# Patient Record
Sex: FEMALE | Race: WHITE | NOT HISPANIC OR LATINO | Employment: OTHER | URBAN - METROPOLITAN AREA
[De-identification: names, ages, dates, MRNs, and addresses within clinical notes are randomized per-mention and may not be internally consistent; named-entity substitution may affect disease eponyms.]

---

## 2019-07-16 PROBLEM — Z11.3 SCREENING EXAMINATION FOR STD (SEXUALLY TRANSMITTED DISEASE): Status: ACTIVE | Noted: 2019-07-16

## 2023-08-25 ENCOUNTER — OFFICE VISIT (OUTPATIENT)
Dept: OBSTETRICS AND GYNECOLOGY | Facility: CLINIC | Age: 43
End: 2023-08-25
Payer: COMMERCIAL

## 2023-08-25 VITALS
HEIGHT: 64 IN | WEIGHT: 159 LBS | BODY MASS INDEX: 27.14 KG/M2 | SYSTOLIC BLOOD PRESSURE: 118 MMHG | DIASTOLIC BLOOD PRESSURE: 76 MMHG

## 2023-08-25 DIAGNOSIS — Z11.3 SCREENING EXAMINATION FOR STD (SEXUALLY TRANSMITTED DISEASE): ICD-10-CM

## 2023-08-25 DIAGNOSIS — Z01.419 ROUTINE GYNECOLOGICAL EXAMINATION: Primary | ICD-10-CM

## 2023-08-25 DIAGNOSIS — N89.8 VAGINAL DISCHARGE: ICD-10-CM

## 2023-08-25 DIAGNOSIS — Z01.419 PAP SMEAR, LOW-RISK: ICD-10-CM

## 2023-08-25 DIAGNOSIS — Z11.51 SPECIAL SCREENING EXAMINATION FOR HUMAN PAPILLOMAVIRUS (HPV): ICD-10-CM

## 2023-08-25 LAB
BILIRUB BLD-MCNC: NEGATIVE MG/DL
CLARITY, POC: CLEAR
COLOR UR: YELLOW
GLUCOSE UR STRIP-MCNC: NEGATIVE MG/DL
KETONES UR QL: NEGATIVE
LEUKOCYTE EST, POC: NEGATIVE
NITRITE UR-MCNC: NEGATIVE MG/ML
PH UR: 5 [PH] (ref 5–8)
PROT UR STRIP-MCNC: NEGATIVE MG/DL
RBC # UR STRIP: NEGATIVE /UL
SP GR UR: 1.03 (ref 1–1.03)
UROBILINOGEN UR QL: NORMAL

## 2023-08-25 RX ORDER — VENLAFAXINE HYDROCHLORIDE 75 MG/1
75 CAPSULE, EXTENDED RELEASE ORAL DAILY
COMMUNITY
Start: 2023-06-23

## 2023-08-25 RX ORDER — VENLAFAXINE HYDROCHLORIDE 37.5 MG/1
1 CAPSULE, EXTENDED RELEASE ORAL DAILY
COMMUNITY
Start: 2023-06-06

## 2023-08-25 NOTE — PROGRESS NOTES
GYN Annual Exam     Chief Complaint   Patient presents with    Gynecologic Exam       Lizeth Salomon is a 43 y.o. female who presents for annual well woman exam. She is an established patient. She is sexually active.  She is S/P partial hysterectomy d/t AUB.  Denies VB.     She is asking for a full STD screen today. She is worried about him being unfaithful.     HPI    OB History    No obstetric history on file.         Last Pap : uncertain   MM/23   History of abnormal Pap smear: no  Family history of uterine, colon or ovarian cancer: no  Family history of breast cancer: no  History of abnormal mammogram: yes -   Gardasil Vaccine: did not get     Past Medical History:   Diagnosis Date    Abnormal Pap smear of vagina and viginal HPV     Anemia     Asthma     no breathing complications for 13+ years     Bacterial vaginosis     Blood clotting disorder     Breast pain     Bronchitis     Cholelithiasis     Chronic cholecystitis     Contact dermatitis     Cough     Hypothyroidism     Sinusitis, acute     Sore throat     Vagina, candidiasis     Vaginal discharge     Varicella        Past Surgical History:   Procedure Laterality Date    HYSTERECTOMY      LAPAROSCOPIC CHOLECYSTECTOMY      TUBAL ABDOMINAL LIGATION           Current Outpatient Medications:     levothyroxine (SYNTHROID, LEVOTHROID) 75 MCG tablet, Take 1 tablet by mouth Daily., Disp: , Rfl:     pantoprazole (PROTONIX) 40 MG EC tablet, Take 1 tablet by mouth Daily., Disp: , Rfl:     Probiotic Product (PRO-BIOTIC BLEND PO), Take  by mouth., Disp: , Rfl:     valACYclovir (VALTREX) 500 MG tablet, Take 1 tablet by mouth Daily., Disp: 30 tablet, Rfl: 0    loratadine (CLARITIN) 10 MG tablet, Take 1 tablet by mouth Daily As Needed. (Patient not taking: Reported on 2023), Disp: , Rfl:     montelukast (SINGULAIR) 10 MG tablet, Take 1 tablet by mouth Every Night for 30 days., Disp: 30 tablet, Rfl: 0    promethazine-dextromethorphan (PROMETHAZINE-DM) 6.25-15  "MG/5ML syrup, Take 5 mL by mouth 4 (Four) Times a Day As Needed for Cough. (Patient not taking: Reported on 8/25/2023), Disp: 118 mL, Rfl: 0    venlafaxine XR (EFFEXOR-XR) 37.5 MG 24 hr capsule, Take 1 capsule by mouth Daily. (Patient not taking: Reported on 8/25/2023), Disp: , Rfl:     venlafaxine XR (EFFEXOR-XR) 75 MG 24 hr capsule, Take 1 capsule by mouth Daily. (Patient not taking: Reported on 8/25/2023), Disp: , Rfl:     Allergies   Allergen Reactions    Penicillins Rash       Social History     Tobacco Use    Smoking status: Never    Smokeless tobacco: Never   Vaping Use    Vaping Use: Never used   Substance Use Topics    Alcohol use: No    Drug use: Defer       Family History   Problem Relation Age of Onset    No Known Problems Mother     No Known Problems Father        Review of Systems   Constitutional: Negative.    Respiratory: Negative.     Cardiovascular: Negative.    Gastrointestinal: Negative.    Genitourinary: Negative.    Musculoskeletal: Negative.    Skin: Negative.    Neurological: Negative.    Psychiatric/Behavioral: Negative.       /76   Ht 162.6 cm (64\")   Wt 72.1 kg (159 lb)   BMI 27.29 kg/mý     Physical Exam  Vitals reviewed.   Constitutional:       Appearance: She is well-developed.   Neck:      Thyroid: No thyromegaly.   Cardiovascular:      Rate and Rhythm: Normal rate and regular rhythm.      Heart sounds: Normal heart sounds.   Pulmonary:      Effort: Pulmonary effort is normal.      Breath sounds: Normal breath sounds.   Chest:   Breasts:     Right: No inverted nipple, mass, nipple discharge, skin change or tenderness.      Left: No inverted nipple, mass, nipple discharge, skin change or tenderness.   Abdominal:      General: Bowel sounds are normal.      Palpations: Abdomen is soft.   Genitourinary:     Exam position: Supine.      Labia:         Right: No rash, tenderness, lesion or injury.         Left: No rash, tenderness, lesion or injury.       Vagina: No signs of injury " and foreign body. No vaginal discharge, erythema, tenderness or bleeding.      Uterus: Absent.       Adnexa:         Right: No mass, tenderness or fullness.          Left: No mass, tenderness or fullness.        Rectum: Normal.   Musculoskeletal:         General: Normal range of motion.      Cervical back: Normal range of motion and neck supple.   Skin:     General: Skin is warm and dry.   Neurological:      General: No focal deficit present.      Mental Status: She is alert and oriented to person, place, and time.   Psychiatric:         Mood and Affect: Mood normal.         Behavior: Behavior normal.          Assessment     Well woman exam   S/P Hysterectomy   STD screen       Plan   Well woman exam: Pap collected yes. Instructed on how to perform SBE. Recommend MVI daily.  Screening MMG normal 7/23.  Contraception: S/P hysterectomy    STD: Enc condoms. Desires STD screen today- Yes. Serum, Pap , and NuSwab  Smoking status: non smoker  Body mass index is 27.29 kg/mý.  HPV vaccine- Enc vaccine     RTO BRIGHT Siegel  8/25/2023  11:42 EDT

## 2023-08-26 LAB
HBV SURFACE AG SERPL QL IA: NEGATIVE
HCV IGG SERPL QL IA: NON REACTIVE
HIV 1+2 AB+HIV1 P24 AG SERPL QL IA: NON REACTIVE
HSV1 IGG SER IA-ACNC: 28.9 INDEX (ref 0–0.9)
HSV2 IGG SER IA-ACNC: 7.83 INDEX (ref 0–0.9)
RPR SER QL: NON REACTIVE

## 2023-08-29 LAB
C TRACH RRNA CVX QL NAA+PROBE: NEGATIVE
CYTOLOGIST CVX/VAG CYTO: NORMAL
CYTOLOGY CVX/VAG DOC CYTO: NORMAL
CYTOLOGY CVX/VAG DOC THIN PREP: NORMAL
DX ICD CODE: NORMAL
HIV 1 & 2 AB SER-IMP: NORMAL
HPV GENOTYPE REFLEX: NORMAL
HPV I/H RISK 4 DNA CVX QL PROBE+SIG AMP: NEGATIVE
N GONORRHOEA RRNA CVX QL NAA+PROBE: NEGATIVE
OTHER STN SPEC: NORMAL
STAT OF ADQ CVX/VAG CYTO-IMP: NORMAL
T VAGINALIS RRNA SPEC QL NAA+PROBE: NEGATIVE

## 2023-08-31 LAB
A VAGINAE DNA VAG QL NAA+PROBE: NORMAL SCORE
BVAB2 DNA VAG QL NAA+PROBE: NORMAL SCORE
C ALBICANS DNA VAG QL NAA+PROBE: NEGATIVE
C GLABRATA DNA VAG QL NAA+PROBE: NEGATIVE
M GENITALIUM DNA SPEC QL NAA+PROBE: NEGATIVE
M HOMINIS DNA SPEC QL NAA+PROBE: NEGATIVE
MEGA1 DNA VAG QL NAA+PROBE: NORMAL SCORE
UREAPLASMA DNA SPEC QL NAA+PROBE: POSITIVE

## 2023-10-03 RX ORDER — VALACYCLOVIR HYDROCHLORIDE 500 MG/1
500 TABLET, FILM COATED ORAL DAILY
Qty: 30 TABLET | Refills: 0 | Status: SHIPPED | OUTPATIENT
Start: 2023-10-03

## 2023-11-30 ENCOUNTER — OFFICE VISIT (OUTPATIENT)
Dept: OBSTETRICS AND GYNECOLOGY | Facility: CLINIC | Age: 43
End: 2023-11-30
Payer: COMMERCIAL

## 2023-11-30 VITALS
HEIGHT: 64 IN | WEIGHT: 163.4 LBS | DIASTOLIC BLOOD PRESSURE: 78 MMHG | SYSTOLIC BLOOD PRESSURE: 122 MMHG | BODY MASS INDEX: 27.9 KG/M2

## 2023-11-30 DIAGNOSIS — N89.8 VAGINAL DISCHARGE: Primary | ICD-10-CM

## 2023-11-30 LAB
BILIRUB BLD-MCNC: NEGATIVE MG/DL
CLARITY, POC: CLEAR
COLOR UR: YELLOW
GLUCOSE UR STRIP-MCNC: NEGATIVE MG/DL
KETONES UR QL: NEGATIVE
LEUKOCYTE EST, POC: NEGATIVE
NITRITE UR-MCNC: NEGATIVE MG/ML
PH UR: 5 [PH] (ref 5–8)
PROT UR STRIP-MCNC: NEGATIVE MG/DL
RBC # UR STRIP: NEGATIVE /UL
SP GR UR: 1 (ref 1–1.03)
UROBILINOGEN UR QL: NORMAL

## 2023-11-30 RX ORDER — ALBUTEROL SULFATE 2.5 MG/3ML
2.5 SOLUTION RESPIRATORY (INHALATION)
COMMUNITY
Start: 2023-11-06 | End: 2023-12-06

## 2023-11-30 RX ORDER — ALBUTEROL SULFATE 90 UG/1
2 AEROSOL, METERED RESPIRATORY (INHALATION)
COMMUNITY
Start: 2023-11-03 | End: 2024-01-07

## 2023-11-30 NOTE — PROGRESS NOTES
"Subjective     CC: Vaginal discharge       Lizeth Salomon is a 43 y.o. No obstetric history on file. whose LMP is No LMP recorded. Patient has had a hysterectomy.. She presents with c/o vaginal discharge. The discharge started approx 3-4 weeks ago. The discharge is white in color. Sometimes the discharge is thicker than other times. The odor is mild.     She was seen in 8/23 and treated for ureaplasma. She is not sexually active with anyone right now. She is getting .     HPI    HPI    The following portions of the patient's history were reviewed and updated as appropriate:vital signs, allergies, current medications, past medical history, past social history, past surgical history, and problem list      Review of Systems     Review of Systems   Constitutional: Negative.    Gastrointestinal: Negative.    Genitourinary:  Positive for vaginal discharge.   Musculoskeletal: Negative.    Skin: Negative.    Psychiatric/Behavioral: Negative.         Objective      /78   Ht 162.2 cm (63.86\")   Wt 74.1 kg (163 lb 6.4 oz)   BMI 28.17 kg/m²     Physical Exam    Physical Exam  Vitals and nursing note reviewed.   Constitutional:       Appearance: Normal appearance.   Genitourinary:     Labia:         Right: No rash, tenderness or lesion.         Left: No rash, tenderness or lesion.       Vagina: No signs of injury and foreign body. Vaginal discharge present. No erythema, tenderness or bleeding.      Comments: Cuff intact   Musculoskeletal:         General: Normal range of motion.   Skin:     General: Skin is warm and dry.   Neurological:      General: No focal deficit present.      Mental Status: She is alert and oriented to person, place, and time.   Psychiatric:         Mood and Affect: Mood normal.         Behavior: Behavior normal.         Lab Review   Labs: No data reviewed     Imaging   No data reviewed    Assessment  Diagnoses and all orders for this visit:    1. Vaginal discharge (Primary)  -     POC " Urinalysis Dipstick  -     NuSwab VG+ - Swab, Vagina  -     Genital Mycoplasmas MIRTA, Swab - Swab, Vagina        Additional Assessment:   Vaginal discharge    Plan     Vaginal discharge- Uncertain cause. Check NuSwab. Plan treatment based on swab results.     RTO PRN       Keyla Bergman, APRN  12/4/2023

## 2023-12-04 LAB
A VAGINAE DNA VAG QL NAA+PROBE: NORMAL SCORE
BVAB2 DNA VAG QL NAA+PROBE: NORMAL SCORE
C ALBICANS DNA VAG QL NAA+PROBE: NEGATIVE
C GLABRATA DNA VAG QL NAA+PROBE: NEGATIVE
C TRACH DNA VAG QL NAA+PROBE: NEGATIVE
M GENITALIUM DNA SPEC QL NAA+PROBE: NEGATIVE
M HOMINIS DNA SPEC QL NAA+PROBE: NEGATIVE
MEGA1 DNA VAG QL NAA+PROBE: NORMAL SCORE
N GONORRHOEA DNA VAG QL NAA+PROBE: NEGATIVE
T VAGINALIS DNA VAG QL NAA+PROBE: NEGATIVE
UREAPLASMA DNA SPEC QL NAA+PROBE: POSITIVE

## 2023-12-05 DIAGNOSIS — J01.90 ACUTE RHINOSINUSITIS: ICD-10-CM

## 2023-12-05 RX ORDER — AZITHROMYCIN 250 MG/1
TABLET, FILM COATED ORAL
Qty: 6 TABLET | Refills: 0 | Status: SHIPPED | OUTPATIENT
Start: 2023-12-05

## 2024-01-18 ENCOUNTER — OFFICE VISIT (OUTPATIENT)
Dept: OBSTETRICS AND GYNECOLOGY | Facility: CLINIC | Age: 44
End: 2024-01-18
Payer: COMMERCIAL

## 2024-01-18 VITALS
BODY MASS INDEX: 27.66 KG/M2 | WEIGHT: 162 LBS | SYSTOLIC BLOOD PRESSURE: 112 MMHG | HEIGHT: 64 IN | DIASTOLIC BLOOD PRESSURE: 84 MMHG

## 2024-01-18 DIAGNOSIS — N89.8 VAGINAL DISCHARGE: Primary | ICD-10-CM

## 2024-01-18 PROCEDURE — 81002 URINALYSIS NONAUTO W/O SCOPE: CPT | Performed by: NURSE PRACTITIONER

## 2024-01-18 PROCEDURE — 99213 OFFICE O/P EST LOW 20 MIN: CPT | Performed by: NURSE PRACTITIONER

## 2024-01-18 PROCEDURE — 99459 PELVIC EXAMINATION: CPT | Performed by: NURSE PRACTITIONER

## 2024-01-18 RX ORDER — MONTELUKAST SODIUM 10 MG/1
10 TABLET ORAL DAILY
COMMUNITY
Start: 2023-11-03

## 2024-01-18 RX ORDER — ALBUTEROL SULFATE 2.5 MG/3ML
SOLUTION RESPIRATORY (INHALATION) EVERY 4 HOURS PRN
COMMUNITY
Start: 2023-12-07

## 2024-01-18 RX ORDER — METRONIDAZOLE 500 MG/1
500 TABLET ORAL 2 TIMES DAILY
Qty: 14 TABLET | Refills: 0 | Status: SHIPPED | OUTPATIENT
Start: 2024-01-18 | End: 2024-01-25

## 2024-01-18 NOTE — PROGRESS NOTES
"Subjective     CC: vaginal discharge      Lizeth Salomon is a 43 y.o. No obstetric history on file. whose LMP is No LMP recorded. Patient has had a hysterectomy.. She presents with c/o vaginal discharge and odor.She thinks she has BV.  She recently had sex with her  whom she is  from and she found out he had another partner. She denies itching or irritation. She reports \"I can smell it.\"     A review of NuSwab shows + ureaplasma with every swab. She has never had a negative.     HPI    HPI    The following portions of the patient's history were reviewed and updated as appropriate:vital signs, allergies, current medications, past medical history, past social history, past surgical history, and problem list      Review of Systems     Review of Systems   Constitutional: Negative.    Genitourinary:  Positive for vaginal discharge. Vaginal pain: with odor.      Objective      /84   Ht 162.2 cm (63.86\")   Wt 73.5 kg (162 lb)   BMI 27.93 kg/m²     Physical Exam    Physical Exam  Vitals and nursing note reviewed.   Constitutional:       Appearance: Normal appearance.   Genitourinary:     Labia:         Right: No rash, tenderness or lesion.         Left: No rash, tenderness or lesion.       Vagina: No signs of injury and foreign body. Vaginal discharge present. No erythema, tenderness or bleeding.      Uterus: Absent.    Neurological:      Mental Status: She is alert.       Lab Review   Labs: Urinalysis - with micro     Imaging   None    Assessment  Diagnoses and all orders for this visit:    1. Vaginal discharge (Primary)  -     POC Urinalysis Dipstick    Other orders  -     metroNIDAZOLE (FLAGYL) 500 MG tablet; Take 1 tablet by mouth 2 (Two) Times a Day for 7 days.  Dispense: 14 tablet; Refill: 0        Additional Assessment:   Vaginal discharge    Plan     Vaginal discharge- Suspect BV. Check NuSwab. ERX sent for flagyl.     RTO PRN     Keyla Bergman, APRN  1/18/2024        " Unknown if ever smoked

## 2024-01-20 LAB
A VAGINAE DNA VAG QL NAA+PROBE: ABNORMAL SCORE
BVAB2 DNA VAG QL NAA+PROBE: ABNORMAL SCORE
C ALBICANS DNA VAG QL NAA+PROBE: NEGATIVE
C GLABRATA DNA VAG QL NAA+PROBE: NEGATIVE
C TRACH DNA VAG QL NAA+PROBE: NEGATIVE
M GENITALIUM DNA SPEC QL NAA+PROBE: NEGATIVE
M HOMINIS DNA SPEC QL NAA+PROBE: NEGATIVE
MEGA1 DNA VAG QL NAA+PROBE: ABNORMAL SCORE
N GONORRHOEA DNA VAG QL NAA+PROBE: NEGATIVE
T VAGINALIS DNA VAG QL NAA+PROBE: NEGATIVE
UREAPLASMA DNA SPEC QL NAA+PROBE: POSITIVE

## 2024-03-11 RX ORDER — VALACYCLOVIR HYDROCHLORIDE 500 MG/1
500 TABLET, FILM COATED ORAL DAILY
Qty: 30 TABLET | Refills: 0 | Status: SHIPPED | OUTPATIENT
Start: 2024-03-11

## 2024-08-30 ENCOUNTER — TELEPHONE (OUTPATIENT)
Dept: OBSTETRICS AND GYNECOLOGY | Facility: CLINIC | Age: 44
End: 2024-08-30

## 2024-08-30 DIAGNOSIS — Z12.31 ENCOUNTER FOR SCREENING MAMMOGRAM FOR MALIGNANT NEOPLASM OF BREAST: Primary | ICD-10-CM

## 2024-08-30 NOTE — TELEPHONE ENCOUNTER
Caller: Lizeth Salomon    Relationship: Self    Best call back number: [phone]    What orders are you requesting (i.e. lab or imaging): MAMMO ORDERS NO IMPLANTS    In what timeframe would the patient need to come in: LAST ONE WAS 7-20-23    Where will you receive your lab/imaging services: [facility] @ [address] [place], [place] [id number] PH#[phone]    Additional notes: PT ADV SHE HAS A   MARKER IN HER LEFT BREAST IF YOU NEEDED THAT INFO

## 2024-08-31 PROBLEM — R50.9 FEVER: Status: ACTIVE | Noted: 2024-08-31

## 2024-09-26 ENCOUNTER — OFFICE VISIT (OUTPATIENT)
Dept: OBSTETRICS AND GYNECOLOGY | Facility: CLINIC | Age: 44
End: 2024-09-26
Payer: COMMERCIAL

## 2024-09-26 VITALS
DIASTOLIC BLOOD PRESSURE: 68 MMHG | HEIGHT: 64 IN | SYSTOLIC BLOOD PRESSURE: 110 MMHG | BODY MASS INDEX: 27.93 KG/M2 | WEIGHT: 163.6 LBS

## 2024-09-26 DIAGNOSIS — Z12.72 SMEAR, VAGINAL, AS PART OF ROUTINE GYNECOLOGICAL EXAMINATION: ICD-10-CM

## 2024-09-26 DIAGNOSIS — Z23 NEED FOR HPV VACCINE: ICD-10-CM

## 2024-09-26 DIAGNOSIS — Z01.419 ROUTINE GYNECOLOGICAL EXAMINATION: Primary | ICD-10-CM

## 2024-09-26 DIAGNOSIS — Z01.419 SMEAR, VAGINAL, AS PART OF ROUTINE GYNECOLOGICAL EXAMINATION: ICD-10-CM

## 2024-09-26 PROCEDURE — 90651 9VHPV VACCINE 2/3 DOSE IM: CPT | Performed by: NURSE PRACTITIONER

## 2024-09-26 PROCEDURE — 99396 PREV VISIT EST AGE 40-64: CPT | Performed by: NURSE PRACTITIONER

## 2024-09-26 PROCEDURE — 90471 IMMUNIZATION ADMIN: CPT | Performed by: NURSE PRACTITIONER

## 2024-09-26 RX ORDER — BUDESONIDE AND FORMOTEROL FUMARATE DIHYDRATE 80; 4.5 UG/1; UG/1
2 AEROSOL RESPIRATORY (INHALATION)
COMMUNITY
Start: 2024-09-17 | End: 2025-09-17

## 2024-09-26 RX ORDER — BUSPIRONE HYDROCHLORIDE 10 MG/1
10 TABLET ORAL
COMMUNITY
Start: 2024-09-17 | End: 2025-09-17

## 2024-09-26 NOTE — PROGRESS NOTES
GYN Annual Exam     Chief Complaint   Patient presents with    Gynecologic Exam     Annual       Lizeth Salomon is a 44 y.o. female who presents for annual well woman exam. She is an established patient. She is not sexually active at this time. She is S/P partial hysterectomy d/t AUB. Denies VB. No HRT.     She c/o hot flashes and mood changes. Her TSH was normal recently. Her Estrogen and progesterone are normal. She recently started Buspar for anxiety.     She is in the process of getting .     Performing SBE: does do     She is being followed by her PCP for elevated liver enzymes. She does not drink a lot. She has an upcoming appt to see her PCP.     HPI    OB History    No obstetric history on file.       MM/24  Last Pap :   History of abnormal Pap smear: no  Family history of uterine, colon or ovarian cancer: no  Family history of breast cancer: no  History of abnormal mammogram: yes -   Gardasil Vaccine: did not get        Past Medical History:   Diagnosis Date    Abnormal Pap smear of vagina and viginal HPV     Anemia     Asthma     no breathing complications for 13+ years     Bacterial vaginosis     Blood clotting disorder     Breast pain     Bronchitis     Cholelithiasis     Chronic cholecystitis     Contact dermatitis     Cough     Hypothyroidism     Sinusitis, acute     Sore throat     Vagina, candidiasis     Vaginal discharge     Varicella        Past Surgical History:   Procedure Laterality Date    HYSTERECTOMY      LAPAROSCOPIC CHOLECYSTECTOMY      TUBAL ABDOMINAL LIGATION           Current Outpatient Medications:     albuterol (PROVENTIL) (2.5 MG/3ML) 0.083% nebulizer solution, Take  by nebulization Every 4 (Four) Hours As Needed for Wheezing., Disp: , Rfl:     budesonide-formoterol (SYMBICORT) 80-4.5 MCG/ACT inhaler, Inhale 2 puffs., Disp: , Rfl:     busPIRone (BUSPAR) 10 MG tablet, Take 1 tablet by mouth., Disp: , Rfl:     levothyroxine (SYNTHROID, LEVOTHROID) 75 MCG tablet, Take  "1 tablet by mouth Daily., Disp: , Rfl:     montelukast (SINGULAIR) 10 MG tablet, Take 1 tablet by mouth Daily., Disp: , Rfl:     pantoprazole (PROTONIX) 40 MG EC tablet, Take 1 tablet by mouth Daily., Disp: , Rfl:     Probiotic Product (PRO-BIOTIC BLEND PO), Take  by mouth., Disp: , Rfl:     azithromycin (ZITHROMAX) 250 MG tablet, Take 2 tablets the first day, then 1 tablet daily for 4 days., Disp: 6 tablet, Rfl: 0    Allergies   Allergen Reactions    Penicillins Rash       Social History     Tobacco Use    Smoking status: Never    Smokeless tobacco: Never   Vaping Use    Vaping status: Never Used   Substance Use Topics    Alcohol use: No    Drug use: Defer       Family History   Problem Relation Age of Onset    No Known Problems Mother     No Known Problems Father        Review of Systems   Constitutional: Negative.    Respiratory: Negative.     Cardiovascular: Negative.    Gastrointestinal: Negative.    Genitourinary: Negative.    Musculoskeletal: Negative.    Skin: Negative.    Neurological: Negative.    Psychiatric/Behavioral: Negative.         /68   Ht 162.6 cm (64.02\")   Wt 74.2 kg (163 lb 9.6 oz)   BMI 28.07 kg/m²     Physical Exam  Vitals reviewed.   Constitutional:       Appearance: She is well-developed.   Neck:      Thyroid: No thyromegaly.   Cardiovascular:      Rate and Rhythm: Normal rate and regular rhythm.   Pulmonary:      Effort: Pulmonary effort is normal.      Breath sounds: Normal breath sounds.   Chest:   Breasts:     Right: No inverted nipple, mass, nipple discharge, skin change or tenderness.      Left: No inverted nipple, mass, nipple discharge, skin change or tenderness.   Abdominal:      General: Bowel sounds are normal.      Palpations: Abdomen is soft.   Genitourinary:     Exam position: Supine.      Labia:         Right: No rash, tenderness, lesion or injury.         Left: No rash, tenderness, lesion or injury.       Vagina: No signs of injury and foreign body. No vaginal " discharge, erythema, tenderness or bleeding.      Uterus: Absent.       Adnexa:         Right: No mass, tenderness or fullness.          Left: No mass, tenderness or fullness.        Rectum: Normal.   Musculoskeletal:      Cervical back: Normal range of motion and neck supple.   Skin:     General: Skin is warm and dry.   Neurological:      General: No focal deficit present.      Mental Status: She is alert and oriented to person, place, and time.   Psychiatric:         Mood and Affect: Mood normal.         Behavior: Behavior normal.            Assessment     Well woman exam   S/P hysterectomy   HPV vaccine     Plan   Well woman exam: Pap collected Yes. Instructed on how to perform SBE. Recommend MVI daily.    Contraception: S/P hysterectomy   STD: Enc condoms. Desires STD screen today- No.   Smoking status: non smoker  Body mass index is 28.07 kg/m².  HPV vaccine: Info provided. Desires HPV vaccine series to be started today.      RTO PRAVEENA Bergman, BRIGHT  9/26/2024  14:26 EDT

## 2024-10-02 LAB
CYTOLOGIST CVX/VAG CYTO: NORMAL
CYTOLOGY CVX/VAG DOC CYTO: NORMAL
CYTOLOGY CVX/VAG DOC THIN PREP: NORMAL
DX ICD CODE: NORMAL
HPV I/H RISK 4 DNA CVX QL PROBE+SIG AMP: NEGATIVE
Lab: NORMAL
OTHER STN SPEC: NORMAL
STAT OF ADQ CVX/VAG CYTO-IMP: NORMAL

## 2024-11-26 ENCOUNTER — CLINICAL SUPPORT (OUTPATIENT)
Dept: OBSTETRICS AND GYNECOLOGY | Facility: CLINIC | Age: 44
End: 2024-11-26
Payer: COMMERCIAL

## 2024-11-26 DIAGNOSIS — Z23 NEED FOR HPV VACCINATION: Primary | ICD-10-CM

## 2025-01-28 RX ORDER — VALACYCLOVIR HYDROCHLORIDE 500 MG/1
500 TABLET, FILM COATED ORAL DAILY
Qty: 30 TABLET | Refills: 0 | Status: SHIPPED | OUTPATIENT
Start: 2025-01-28

## 2025-03-26 ENCOUNTER — CLINICAL SUPPORT (OUTPATIENT)
Dept: OBSTETRICS AND GYNECOLOGY | Facility: CLINIC | Age: 45
End: 2025-03-26
Payer: COMMERCIAL

## 2025-03-26 DIAGNOSIS — Z23 NEED FOR HPV VACCINATION: Primary | ICD-10-CM
